# Patient Record
(demographics unavailable — no encounter records)

---

## 2025-03-18 NOTE — DATA REVIEWED
[FreeTextEntry1] : EXAM: MRI CERVICAL SPINE WITHOUT CONTRAST  HISTORY: Neck pain.  COMPARISON: None  TECHNIQUE: Sagittal T1, T2 and STIR, and axial T2 weighted and gradient-echo sequences were obtained. No intravenous contrast was administered.  Images were acquired on a Siemens Espree Wide Bore at 1.5T.  FINDINGS: There is mild reversal of the lower cervical lordosis. The heights of the vertebral bodies are intact. The bony structures demonstrate uniform marrow signal.   At C2-3, C3-4 and C4-5 the discs are normal in height and signal with intact annulus.  At C5-6 there is disc desiccation, mild disc degeneration.  At C6-7 there is a herniated disc into left subarticular recess compressing the left C7 nerve root (series 107 image 43). Disc material extends slightly into left neural foramen.  There are no intra or epidural masses. The spinal cord demonstrates normal signal intensity. The craniocervical junction is normal. There is no prevertebral soft tissue swelling.   IMPRESSION:   Left subarticular disc herniation at C6-7 compressing the left C7 nerve root with slight extension of left neural foramen.  Thank you for the opportunity to participate in the care of this patient.    KENNETH BLUMBERG MD Bd Cert Radiologist  - Electronically Signed: 03- 3:30 PM Physician to Physician Direct Line is: [phone]

## 2025-03-18 NOTE — ASSESSMENT
[FreeTextEntry1] : Mr. NELSON CHURCHILL is a 36 year M suffering from neck and left upper extremity pain, that based upon today's subjective complaints, physical examination, and KYLE review, is likely seocndary to cervical radiculopathy  >> Medications  Chronic opioid use for non-malignant pain, in particular at high doses would not be recommended since it can potentially lead to hyperalgesia (hypersensitivity), tolerance and addiction.    Trial Gabapentin 100 m gpo QHS   >> Interventions   Consider for Left C7/t1 ILESI - not amenable at this time due to fear of possible allergy  (NSAID's , Contrast dye, Anaphylactic responses)   >> Therapy and Other Modalities   diagnosis: cervical radiculopathy  periscapular and scapulothoracic stretching and strengthening teach home exercise regimen massage and myofascial release increase ROM, strengthening, postural training, other modalities ad sk[facility]a physical therapy - 2x weekly / 6 weeks Precautions - universal safety, falls  >> Imaging and Other Studies  I have personally reviewed the images in detail together with the patient today, and I have answered all questions regarding this condition to the best of my ability, to the patient's satisfaction.  >> Consults  Ortho eval pending

## 2025-03-18 NOTE — PHYSICAL EXAM
[de-identified] : General Appearance: Level of consciousness: Alert Body habitus: Well-nourished Signs of distress: Some noticeable discomfort. Hygiene: Clean   Psychiatric: Appropriate mood and affect. Cooperative.   Skin: Nailbeds pink with no cyanosis or clubbing. Lesions or rashes: None observed   Head and Neck: The head is normocephalic and atraumatic Eyes: Conjunctivae  clear without exudates. Sclera is non-icteric Ears: No discharge. Hearing is intact with good acuity Nose: No discharge. No nasal flaring Mouth and throat: Trachea Midline, teeth and gums are without obvious lesion   Respiratory: Breathing pattern: Normal Chest movement: Symmetrical Use of accessory muscles: Absent Cough: Absent Wheezing: Absent   Cardiovascular System: Pulse: Regular Cyanosis: Absent   Abdomen: Inspection: No distention Visible pulsations: Absent   Musculoskeletal System: Muscle strength:   strength is normal bilaterally. Gait: Steady, NO assistive device Posture: Upright Rises from a seated position with SOME difficulty due to pain     Spine: No gross deformity or signs of trauma. Curvature of the cervical, thoracic, and lumbar spine are within normal limits. Spinous processes are midline. NO tenderness of spinous processes. Paraspinal musculature is NOT hypertonic NO discomfort is noted with flexion MILD discomfort is noted with extension No discomfort is noted with side-to-side rotation   ++ Spurlings LEFT   Neurological System: Cranial nerves 2-12: Grossly intact Motor function: Moving all extremities against gravity Left triceps 4-/5 Sensation: Sensation impaired to light touch left c6/7 dermatomal distribution

## 2025-03-18 NOTE — HISTORY OF PRESENT ILLNESS
[Neck Pain] : neck pain [___ wks] : [unfilled] week(s) ago [7] : a maximum pain level of 7/10 [Laying] : laying [Lifting] : lifting [Medications] : medications [Rest] : rest [FreeTextEntry1] : HPI Roro Goetz, a 36-year-old male, presents with a chief complaint of neck pain radiating to his left arm, which began two weeks ago. He describes the pain as constant, characterized by numbness and tingling, and rates it consistently at 7/10 on the pain scale. The pain is exacerbated by lying down and lifting, while medications and rest provide some relief. Mr. Goetz notes that his pain is impairing his ability to perform daily activities and sleep, leading to functional and emotional distress. Despite six weeks of provider-directed treatments, including stretching, his symptoms have persisted and worsened. He reports no numbness, weakness, or red flag symptoms such as bowel/bladder incontinence. An MRI of the cervical spine indicates a left subarticular disc herniation at C6-7, compressing the left C7 nerve root, with slight extension into the left neural foramen, contributing to his symptoms. [FreeTextEntry7] : neck pain radiating to left arm  [de-identified] : Numbness and tingling

## 2025-04-07 NOTE — PHYSICAL EXAM
[de-identified] : Physical Exam (Telemedicine): Gen: AAOX3, NAD HEENT: PERRLA, EOMI, NCAT, NP Pulmonary: No Signs of Respiratory Distress MSK: AROM WFL, Limitations painful cervical ROM Neurological: Grossly neurologically intact, Noted deficits none Gait: Normal, Non-antalgic, Sans AD Derm: No Rash, (-)Lesions, (-)Erythema, (-)Cyanosis Psych: Calm, Cooperative, Conversational   Disclaimer: This is a limited examination for the purposes of conducting a telemedicine. Physical exam maneuvers were modified as necessary to allow patient to self perform. A focused physician physical examination will be performed during in person visits and should be referred to to determine need for further testing, interventions or degree of disability.

## 2025-04-07 NOTE — HISTORY OF PRESENT ILLNESS
[FreeTextEntry1] : Interval Note:  Since last visit the pain intensity has persisted  Recent course of PT with some relief  Has upcoming visit with [facility]  Medication has been allowing patient to go about activities of daily living with less pain.  Moving bowels regularly. No recent falls.   Patient denies any bowel/bladder incontinence, no saddle/perineal anesthesia or any other red flag signs or symptoms.  ---  HPI - Yoni Goetz, a 36-year-old male, presents with a chief complaint of neck pain radiating to his left arm, which began two weeks ago. He describes the pain as constant, characterized by numbness and tingling, and rates it consistently at 7/10 on the pain scale. The pain is exacerbated by lying down and lifting, while medications and rest provide some relief. Mr. Goetz notes that his pain is impairing his ability to perform daily activities and sleep, leading to functional and emotional distress. Despite six weeks of provider-directed treatments, including stretching, his symptoms have persisted and worsened. He reports no numbness, weakness, or red flag symptoms such as bowel/bladder incontinence. An MRI of the cervical spine indicates a left subarticular disc herniation at C6-7, compressing the left C7 nerve root, with slight extension into the left neural foramen, contributing to his symptoms.

## 2025-04-07 NOTE — DATA REVIEWED
[FreeTextEntry1] : EXAM: MRI CERVICAL SPINE WITHOUT CONTRAST  HISTORY: Neck pain.  COMPARISON: None  TECHNIQUE: Sagittal T1, T2 and STIR, and axial T2 weighted and gradient-echo sequences were obtained. No intravenous contrast was administered. Images were acquired on a Siemens Espree Wide Bore at 1.5T.  FINDINGS: There is mild reversal of the lower cervical lordosis. The heights of the vertebral bodies are intact. The bony structures demonstrate uniform marrow signal.  At C2-3, C3-4 and C4-5 the discs are normal in height and signal with intact annulus.  At C5-6 there is disc desiccation, mild disc degeneration.  At C6-7 there is a herniated disc into left subarticular recess compressing the left C7 nerve root (series 107 image 43). Disc material extends slightly into left neural foramen.  There are no intra or epidural masses. The spinal cord demonstrates normal signal intensity. The craniocervical junction is normal. There is no prevertebral soft tissue swelling.  IMPRESSION:  Left subarticular disc herniation at C6-7 compressing the left C7 nerve root with slight extension of left neural foramen.  Thank you for the opportunity to participate in the care of this patient.  KENNETH BLUMBERG MD Bd Cert Radiologist - Electronically Signed: 03- 3:30 PM Physician to Physician Direct Line is: [phone].

## 2025-04-07 NOTE — ASSESSMENT
[FreeTextEntry1] : Mr. NELSON CHURCHILL is a 36 year M suffering from neck and left upper extremity pain, that based upon today's subjective complaints, physical examination, and KYLE review, is likely seocndary to cervical radiculopathy  >> Medications  Chronic opioid use for non-malignant pain, in particular at high doses would not be recommended since it can potentially lead to hyperalgesia (hypersensitivity), tolerance and addiction.    Trial Gabapentin 300 mg po QHS  Medrol Dosepak  >> Interventions   Consider for Left C7/t1 ILESI - not amenable at this time due to fear of possible allergy  (NSAID's , Contrast dye, Anaphylactic responses)   >> Therapy and Other Modalities   diagnosis: cervical radiculopathy  periscapular and scapulothoracic stretching and strengthening teach home exercise regimen massage and myofascial release increase ROM, strengthening, postural training, other modalities ad [facility] physical therapy - 2x weekly / 6 weeks Precautions - universal safety, falls  >> Imaging and Other Studies  I have personally reviewed the images in detail together with the patient today, and I have answered all questions regarding this condition to the best of my ability, to the patient's satisfaction.  >> Consults  Ortho eval pending

## 2025-04-07 NOTE — REASON FOR VISIT
[Follow-Up Visit] : a follow-up pain management visit [Home] : at home, [unfilled] , at the time of the visit. [Medical Office: ([facility])___] : at the medical office located in  [This encounter was initiated by telehealth (audio with video) and converted to telephone (audio only)] : This encounter was initiated by telehealth (audio with video) and converted to telephone (audio only) [Patient preference] : patient preference. [Verbal consent obtained from patient] : the patient, [unfilled]